# Patient Record
Sex: FEMALE | Race: WHITE | NOT HISPANIC OR LATINO | URBAN - METROPOLITAN AREA
[De-identification: names, ages, dates, MRNs, and addresses within clinical notes are randomized per-mention and may not be internally consistent; named-entity substitution may affect disease eponyms.]

---

## 2021-05-11 ENCOUNTER — LAB (OUTPATIENT)
Dept: LAB | Facility: HOSPITAL | Age: 46
End: 2021-05-11

## 2021-05-11 ENCOUNTER — TRANSCRIBE ORDERS (OUTPATIENT)
Dept: ADMINISTRATIVE | Facility: HOSPITAL | Age: 46
End: 2021-05-11

## 2021-05-11 ENCOUNTER — HOSPITAL ENCOUNTER (OUTPATIENT)
Dept: CARDIOLOGY | Facility: HOSPITAL | Age: 46
Discharge: HOME | End: 2021-05-11

## 2021-05-11 DIAGNOSIS — Z01.818 PREOP EXAMINATION: ICD-10-CM

## 2021-05-11 DIAGNOSIS — Z01.818 PREOP EXAMINATION: Primary | ICD-10-CM

## 2021-05-11 PROCEDURE — 93010 ELECTROCARDIOGRAM REPORT: CPT | Performed by: INTERNAL MEDICINE

## 2021-05-11 PROCEDURE — 93005 ELECTROCARDIOGRAM TRACING: CPT | Performed by: OBSTETRICS & GYNECOLOGY

## 2021-05-11 PROCEDURE — 81003 URINALYSIS AUTO W/O SCOPE: CPT

## 2021-05-11 PROCEDURE — 85025 COMPLETE CBC W/AUTO DIFF WBC: CPT

## 2021-05-11 PROCEDURE — 80048 BASIC METABOLIC PNL TOTAL CA: CPT

## 2021-05-11 PROCEDURE — 36415 COLL VENOUS BLD VENIPUNCTURE: CPT

## 2021-05-12 LAB — QT INTERVAL: 421 MS

## 2024-01-10 ENCOUNTER — OFFICE VISIT (OUTPATIENT)
Dept: PODIATRY | Facility: CLINIC | Age: 49
End: 2024-01-10
Payer: COMMERCIAL

## 2024-01-10 VITALS — WEIGHT: 255 LBS | BODY MASS INDEX: 43.54 KG/M2 | HEIGHT: 64 IN | RESPIRATION RATE: 20 BRPM

## 2024-01-10 DIAGNOSIS — M76.61 ACHILLES TENDINITIS, RIGHT LEG: ICD-10-CM

## 2024-01-10 DIAGNOSIS — M92.62 HAGLUND'S DEFORMITY OF BOTH HEELS: ICD-10-CM

## 2024-01-10 DIAGNOSIS — M25.572 CHRONIC PAIN OF BOTH ANKLES: Primary | ICD-10-CM

## 2024-01-10 DIAGNOSIS — M25.571 CHRONIC PAIN OF BOTH ANKLES: Primary | ICD-10-CM

## 2024-01-10 DIAGNOSIS — E66.01 MORBID OBESITY WITH BMI OF 40.0-44.9, ADULT: ICD-10-CM

## 2024-01-10 DIAGNOSIS — G89.29 CHRONIC PAIN OF BOTH ANKLES: Primary | ICD-10-CM

## 2024-01-10 DIAGNOSIS — M76.62 ACHILLES TENDINITIS OF LEFT LOWER EXTREMITY: ICD-10-CM

## 2024-01-10 DIAGNOSIS — M21.861 ACQUIRED POSTERIOR EQUINUS OF BOTH LOWER EXTREMITIES: ICD-10-CM

## 2024-01-10 DIAGNOSIS — M92.61 HAGLUND'S DEFORMITY OF BOTH HEELS: ICD-10-CM

## 2024-01-10 DIAGNOSIS — M21.862 ACQUIRED POSTERIOR EQUINUS OF BOTH LOWER EXTREMITIES: ICD-10-CM

## 2024-01-10 RX ORDER — UBIDECARENONE 30 MG
CAPSULE ORAL
COMMUNITY
Start: 2016-04-14

## 2024-01-10 RX ORDER — CHOLECALCIFEROL (VITAMIN D3) 125 MCG
CAPSULE ORAL
COMMUNITY

## 2024-01-10 RX ORDER — FLUTICASONE PROPIONATE 50 MCG
SPRAY, SUSPENSION (ML) NASAL
COMMUNITY
Start: 2016-04-14

## 2024-01-10 RX ORDER — CETIRIZINE HYDROCHLORIDE 10 MG/1
CAPSULE, LIQUID FILLED ORAL
COMMUNITY
Start: 2016-04-14

## 2024-01-10 RX ORDER — TRIAMCINOLONE ACETONIDE 40 MG/ML
20 INJECTION, SUSPENSION INTRA-ARTICULAR; INTRAMUSCULAR ONCE
Status: COMPLETED | OUTPATIENT
Start: 2024-01-10 | End: 2024-01-10

## 2024-01-10 RX ORDER — ASPIRIN 325 MG
TABLET ORAL
COMMUNITY
Start: 2016-04-14

## 2024-01-10 RX ORDER — ERGOCALCIFEROL 1.25 MG/1
50000 CAPSULE ORAL 2 TIMES WEEKLY
COMMUNITY

## 2024-01-10 RX ORDER — MELATONIN
COMMUNITY
Start: 2016-04-14

## 2024-01-10 RX ORDER — MELOXICAM 15 MG/1
TABLET ORAL
COMMUNITY
Start: 2016-04-14

## 2024-01-10 RX ADMIN — TRIAMCINOLONE ACETONIDE 20 MG: 40 INJECTION, SUSPENSION INTRA-ARTICULAR; INTRAMUSCULAR at 12:08

## 2024-01-10 NOTE — PROGRESS NOTES
"01/10/2024  Foot and Ankle Surgery - New Patient   Provider: Dr. Bipin Wells DPM  Location: [facility]    Subjective:  Sharri Bernstein is a 48 y.o. female.     Chief Complaint   Patient presents with    Right Ankle - Pain    Left Ankle - Pain    Initial Evaluation     NADINE ayoub md  12/24/2021       The patient is a 48-year-old female who presents to the clinic as a new patient today for evaluation of bilateral ankle pain.    The patient was last seen in 2016. She reports she is no longer a  working 8 to 10 hours per day, ascending and descending stairs. She notes, at that time, her work activities caused repeated \"ripping\" of scar tissue. The patient states she was prescribed meloxicam and referred to physical therapy, which was beneficial for a short time.    Currently, the patient complains of bilateral ankle pain, left greater than right, ongoing over the last 1 year. 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 She adds that occasionally the pain is so severe, it feels as if it is going to \"rip.\" The patient states her pain is impacting her daily activities. Additionally, she states she dislocated her knee in 2022, which is affecting her gait as her knee will no longer fully extend secondary to scar tissue. The patient recalls sustaining an injury to her left ankle in 10/2023, and she is left lower extremity dominant.  The patient reports she trialed heel lifts previously. The patient states her plantar fasciitis is no longer an issue aside from an occasional flareup. She reports she has gradually lost 40 pounds over the last 1 year. The patient states she used to walk 3 to 5 miles every day; however, she can barely ambulate some days. She would like to get a possible temporary placard. She inquiries about a scar tissue release " surgery.    The patient reports a history of gallbladder issues.      Allergies   Allergen Reactions    Penicillins Unknown - High Severity    Sulfa Antibiotics Unknown - High Severity       Past Medical History:   Diagnosis Date    COPD (chronic obstructive pulmonary disease)     Difficulty walking     Plantar fasciitis        History reviewed. No pertinent surgical history.    Family History   Problem Relation Age of Onset    Hypertension Father     Cancer Maternal Grandmother        Social History     Socioeconomic History    Marital status:    Tobacco Use    Smoking status: Former     Passive exposure: Past    Smokeless tobacco: Never   Vaping Use    Vaping Use: Never used   Substance and Sexual Activity    Alcohol use: Not Currently    Drug use: Never    Sexual activity: Yes        Current Outpatient Medications on File Prior to Visit   Medication Sig Dispense Refill    Cetirizine HCl (ZyrTEC Allergy) 10 MG capsule Zyrtec 10 mg capsule   Take by oral route.      fluticasone (FLONASE) 50 MCG/ACT nasal spray fluticasone propionate 50 mcg/actuation nasal spray,suspension   SPRAY 1 SPRAY INTO EACH NOSTRIL EVERY DAY      melatonin 5 MG tablet tablet melatonin 5 mg tablet   TAKE 1 TABLET BY MOUTH EVERYDAY AT BEDTIME      metFORMIN (GLUCOPHAGE) 500 MG tablet metformin 500 mg tablet   TAKE 1 TABLET BY MOUTH TWICE A DAY.      Multiple Vitamins-Minerals (Multivitamin Gummies Adult) chewable tablet MULTIVITAMIN GUMMIES ADULT CHEW      cholecalciferol (Vitamin D-1000 Max St) 25 MCG (1000 UT) tablet VITAMIN D-1000 MAX ST 1000 UNIT TABS (Patient not taking: Reported on 1/10/2024)      meloxicam (Mobic) 15 MG tablet MOBIC 15 MG TABS (Patient not taking: Reported on 1/10/2024)      Omega-3 Fatty Acids (Fish Oil Maximum Strength) 1200 MG capsule FISH OIL MAXIMUM STRENGTH 1200 MG CAPS (Patient not taking: Reported on 1/10/2024)      vitamin D (ERGOCALCIFEROL) 1.25 MG (42061 UT) capsule capsule Take 1 capsule by mouth 2  "(Two) Times a Week. (Patient not taking: Reported on 1/10/2024)       No current facility-administered medications on file prior to visit.       Review of Systems:  General: Denies fever, chills, fatigue, and weakness.  Eyes: Denies vision loss, blurry vision, and excessive redness.  ENT: Denies hearing issues and difficulty swallowing.  Cardiovascular: Denies palpitations, chest pain, or syncopal episodes.  Respiratory: Denies shortness of breath, wheezing, and coughing.  GI: Denies abdominal pain, nausea, and vomiting.   : Denies frequency, hematuria, and urgency.  Musculoskeletal: Denies muscle cramps, joint pains, and stiffness. Positive for bilateral ankle pain, left greater than right.  Derm: Denies rash, open wounds, or suspicious lesions.  Neuro: Denies headaches, numbness, loss of coordination, and tremors.  Psych: Denies anxiety and depression.  Endocrine: Denies temperature intolerance and changes in appetite.  Heme: Denies bleeding disorders or abnormal bruising.     Objective   Resp 20   Ht 162.6 cm (64\")   Wt 116 kg (255 lb)   BMI 43.77 kg/m²     Foot/Ankle Exam    GENERAL  Appearance:  obese  Orientation:  AAOx3  Affect:  appropriate    VASCULAR     Right Foot Vascularity   Normal vascular exam    Dorsalis pedis:  2+  Posterior tibial:  2+  Skin temperature:  warm  Edema grading:  None  CFT:  < 3 seconds  Pedal hair growth:  Present  Varicosities:  none     Left Foot Vascularity   Normal vascular exam    Dorsalis pedis:  2+  Posterior tibial:  2+  Skin temperature:  warm  Edema grading:  None  CFT:  < 3 seconds  Pedal hair growth:  Present  Varicosities:  none     NEUROLOGIC     Right Foot Neurologic   Light touch sensation: normal  Hot/Cold sensation: normal  Achilles reflex:  2+     Left Foot Neurologic   Light touch sensation: normal  Hot/Cold sensation:  normal  Achilles reflex:  2+    MUSCULOSKELETAL     Right Foot Musculoskeletal   Arch:  Normal     Left Foot Musculoskeletal   Arch:  " Normal    MUSCLE STRENGTH     Right Foot Muscle Strength   Normal strength    Foot dorsiflexion:  5  Foot plantar flexion:  5  Foot inversion:  5  Foot eversion:  5     Left Foot Muscle Strength   Normal strength    Foot dorsiflexion:  5  Foot plantar flexion:  5  Foot inversion:  5  Foot eversion:  5    DERMATOLOGIC      Right Foot Dermatologic   Skin  Right foot skin is intact.   Nails comment:  Nails 1-5     Left Foot Dermatologic   Skin  Left foot skin is intact.   Nails comment:  Nails 1-5    TESTS     Right Foot Tests   Anterior drawer: negative  Varus tilt: negative     Left Foot Tests   Anterior drawer: negative  Varus tilt: negative     Right foot additional comments: Discomfort with palpation involving the posterior aspect of the left heel greater than right. Significant hypertrophy noted to the left heel. Moderate equinus contracture with knee extended and flexed to bilateral lower extremities. Mild discomfort involving the inferior aspect of bilateral heels.     Left foot additional comments: Discomfort with palpation involving the posterior aspect of the left heel greater than right. Significant hypertrophy noted to the left heel. Moderate equinus contracture with knee extended and flexed to bilateral lower extremities. Mild discomfort involving the inferior aspect of bilateral heels.      Assessment & Plan   Diagnoses and all orders for this visit:    1. Chronic pain of both ankles (Primary)  -     XR Ankle 3+ View Bilateral; Future  -     Ambulatory Referral to Physical Therapy Evaluate and treat (Strengthening/Stretching, Manual Therapy, Ultrasound, Estim)    2. Achilles tendinitis of left lower extremity  -     triamcinolone acetonide (KENALOG-40) injection 20 mg    3. Achilles tendinitis, right leg    4. Tana's deformity of both heels    5. Acquired posterior equinus of both lower extremities    6. Morbid obesity with BMI of 40.0-44.9, adult      The patient presents to the office as a new  patient today for evaluation of bilateral ankle pain. X-rays of the bilateral ankles, obtained today, 01/10/2024, were independently reviewed today. Imaging, diagnosis, and treatment options were discussed at length with the patient. Explained she has a significant amount of tightness along the Achilles tendon and it is pulling on the heel which creates microscopic tearing involving the insertion of the Achilles tendon. Recommended symptom management with a steroid injection and shoe gear with a slight heel to offload the pull of the tendon. Advised against ambulating while barefoot or in flats, sandals, or flip-flops. Discussed returning to physical therapy. Additionally, discussed surgical intervention consisting of a tendon lengthening procedure to her ankles bilaterally, beginning with the left ankle, along with the risks, benefits, and recovery at length. Discussed the surgery is a 20-minute outpatient procedure, and she will be in a boot approximately 4 to 6 weeks postoperatively. Advised she may need to take some time off work. The patient would like to proceed with a steroid injection at this time. Greater than 30 minutes was spent before, during, and after evaluation for patient care.    Orders Placed This Encounter   Procedures    XR Ankle 3+ View Bilateral     Standing Status:   Future     Number of Occurrences:   1     Standing Expiration Date:   1/10/2025     Order Specific Question:   Reason for Exam:     Answer:   mel aknle pain rm 14 wb     Order Specific Question:   Patient Pregnant     Answer:   No     Order Specific Question:   Release to patient     Answer:   Routine Release [[id number]]    Ambulatory Referral to Physical Therapy Evaluate and treat (Strengthening/Stretching, Manual Therapy, Ultrasound, Estim)     Referral Priority:   Routine     Referral Type:   Physical Therapy     Referral Reason:   Specialty Services Required     Requested Specialty:   Physical Therapy     Number of Visits  Requested:   1        Note is dictated utilizing voice recognition software. Unfortunately this leads to occasional typographical errors. I apologize in advance if the situation occurs. If questions occur please do not hesitate to call our office.    Transcribed from ambient dictation for HELIO Wells DPM by Dara Wakefield.  01/10/24   12:33 EST    Patient or patient representative verbalized consent to the visit recording.  I have personally performed the services described in this document as transcribed by the above individual, and it is both accurate and complete.

## 2024-01-10 NOTE — PROGRESS NOTES
01/10/2024    Retrocalcaneal/ Achilles Steroid Injection: Left heel    Consent and time out was performed before proceeding with the procedure. The skin about the lateral Kagar's triangle region of the left foot was cleansed with alcohol. Ultrasound guidance was used to evaluate the Achilles tendon and injection guidance. Using aseptic technique, a 1.5 mL solution containing 0.5 mL of 0.5% Marcaine plain, 0.5mL of 1% lidocaine plain and 0.5 mL of Kenalog was injected to the insertion site of the Achilles tendon. After the injection, compression was applied followed by a sterile bandage. The patient noted relief from pain and tolerated the injection well without complication.    Transcribed from ambient dictation for HELIO Wells DPM by Dara Wakefield.  01/10/24   12:36 EST    Patient or patient representative verbalized consent to the visit recording.  I have personally performed the services described in this document as transcribed by the above individual, and it is both accurate and complete.

## 2024-01-11 ENCOUNTER — PATIENT ROUNDING (BHMG ONLY) (OUTPATIENT)
Dept: ORTHOPEDIC SURGERY | Facility: CLINIC | Age: 49
End: 2024-01-11
Payer: COMMERCIAL

## 2024-04-15 ENCOUNTER — OFFICE VISIT (OUTPATIENT)
Dept: PODIATRY | Facility: CLINIC | Age: 49
End: 2024-04-15
Payer: COMMERCIAL

## 2024-04-15 VITALS — WEIGHT: 255 LBS | BODY MASS INDEX: 43.54 KG/M2 | RESPIRATION RATE: 20 BRPM | HEIGHT: 64 IN

## 2024-04-15 DIAGNOSIS — M92.61 HAGLUND'S DEFORMITY OF BOTH HEELS: ICD-10-CM

## 2024-04-15 DIAGNOSIS — M92.62 HAGLUND'S DEFORMITY OF BOTH HEELS: ICD-10-CM

## 2024-04-15 DIAGNOSIS — G89.29 CHRONIC PAIN OF BOTH ANKLES: Primary | ICD-10-CM

## 2024-04-15 DIAGNOSIS — M21.862 ACQUIRED POSTERIOR EQUINUS OF BOTH LOWER EXTREMITIES: ICD-10-CM

## 2024-04-15 DIAGNOSIS — M25.572 CHRONIC PAIN OF BOTH ANKLES: Primary | ICD-10-CM

## 2024-04-15 DIAGNOSIS — M21.861 ACQUIRED POSTERIOR EQUINUS OF BOTH LOWER EXTREMITIES: ICD-10-CM

## 2024-04-15 DIAGNOSIS — M76.62 ACHILLES TENDINITIS OF LEFT LOWER EXTREMITY: ICD-10-CM

## 2024-04-15 DIAGNOSIS — M25.571 CHRONIC PAIN OF BOTH ANKLES: Primary | ICD-10-CM

## 2024-04-15 DIAGNOSIS — M77.52 RETROCALCANEAL BURSITIS, LEFT: ICD-10-CM

## 2024-04-15 DIAGNOSIS — E66.01 MORBID OBESITY WITH BMI OF 40.0-44.9, ADULT: ICD-10-CM

## 2024-04-15 PROCEDURE — 20551 NJX 1 TENDON ORIGIN/INSJ: CPT | Performed by: PODIATRIST

## 2024-04-15 PROCEDURE — 99213 OFFICE O/P EST LOW 20 MIN: CPT | Performed by: PODIATRIST

## 2024-04-15 RX ORDER — TRIAMCINOLONE ACETONIDE 40 MG/ML
20 INJECTION, SUSPENSION INTRA-ARTICULAR; INTRAMUSCULAR ONCE
Status: COMPLETED | OUTPATIENT
Start: 2024-04-15 | End: 2024-04-15

## 2024-04-15 RX ADMIN — TRIAMCINOLONE ACETONIDE 20 MG: 40 INJECTION, SUSPENSION INTRA-ARTICULAR; INTRAMUSCULAR at 11:15

## 2024-04-16 NOTE — PROGRESS NOTES
04/15/2024  Foot and Ankle Surgery - Established Patient/Follow-up  Provider: Dr. Bipin Wells DPM  Location: [facility]    Subjective:  Sharri Bernstein is a 48 y.o. female.     Chief Complaint   Patient presents with    Right Ankle - Follow-up, Pain    Left Ankle - Follow-up, Pain    Follow-up     NADINE Ferguson md 2/24/2021       History of Present Illness  The patient presents for evaluation of bilateral ankle pain.    The patient was last evaluated approximately 3 months ago. Despite receiving an injection, she continues to experience significant discomfort, with the left side being more severely affected than the right. The injection provided significant relief, reducing her pain from a 5 to a 2, however, the pain has since returned. She has undergone 8 sessions of physical therapy, during which an ultrasound was performed to ascertain the extent of scar tissue breakage. The patient has not been engaging in low-impact exercises due to a lack of access to a pool in her residence, but has recently reintroduced a recumbent gym into her routine. She has been performing plantar stretches, calf stretches, and alphabet stretches, which have provided significant relief. She is contemplating surgical intervention in the upcoming fall.      Allergies   Allergen Reactions    Penicillins Unknown - High Severity    Sulfa Antibiotics Unknown - High Severity       Current Outpatient Medications on File Prior to Visit   Medication Sig Dispense Refill    Cetirizine HCl (ZyrTEC Allergy) 10 MG capsule Zyrtec 10 mg capsule   Take by oral route.      cholecalciferol (Vitamin D-1000 Max St) 25 MCG (1000 UT) tablet       fluticasone (FLONASE) 50 MCG/ACT nasal spray fluticasone propionate 50 mcg/actuation nasal spray,suspension   SPRAY 1 SPRAY INTO EACH NOSTRIL EVERY DAY      melatonin 5 MG tablet tablet melatonin 5 mg tablet   TAKE 1 TABLET BY MOUTH EVERYDAY AT BEDTIME      metFORMIN (GLUCOPHAGE) 500 MG tablet metformin  "500 mg tablet   TAKE 1 TABLET BY MOUTH TWICE A DAY.      Multiple Vitamins-Minerals (Multivitamin Gummies Adult) chewable tablet MULTIVITAMIN GUMMIES ADULT CHEW      Omega-3 Fatty Acids (Fish Oil Maximum Strength) 1200 MG capsule       vitamin D (ERGOCALCIFEROL) 1.25 MG (36645 UT) capsule capsule Take 1 capsule by mouth 2 (Two) Times a Week.      meloxicam (Mobic) 15 MG tablet MOBIC 15 MG TABS (Patient not taking: Reported on 1/10/2024)       No current facility-administered medications on file prior to visit.       Objective   Resp 20   Ht 162.6 cm (64\")   Wt 116 kg (255 lb)   BMI 43.77 kg/m²     Foot/Ankle Exam  Physical Exam  GENERAL  Appearance:  obese  Orientation:  AAOx3  Affect:  appropriate     VASCULAR      Right Foot Vascularity   Normal vascular exam    Dorsalis pedis:  2+  Posterior tibial:  2+  Skin temperature:  warm  Edema grading:  None  CFT:  < 3 seconds  Pedal hair growth:  Present  Varicosities:  none      Left Foot Vascularity   Normal vascular exam    Dorsalis pedis:  2+  Posterior tibial:  2+  Skin temperature:  warm  Edema grading:  None  CFT:  < 3 seconds  Pedal hair growth:  Present  Varicosities:  none     NEUROLOGIC      Right Foot Neurologic   Light touch sensation: normal  Hot/Cold sensation: normal  Achilles reflex:  2+      Left Foot Neurologic   Light touch sensation: normal  Hot/Cold sensation:  normal  Achilles reflex:  2+     MUSCULOSKELETAL      Right Foot Musculoskeletal   Arch:  Normal      Left Foot Musculoskeletal   Arch:  Normal     MUSCLE STRENGTH      Right Foot Muscle Strength   Normal strength    Foot dorsiflexion:  5  Foot plantar flexion:  5  Foot inversion:  5  Foot eversion:  5      Left Foot Muscle Strength   Normal strength    Foot dorsiflexion:  5  Foot plantar flexion:  5  Foot inversion:  5  Foot eversion:  5     DERMATOLOGIC       Right Foot Dermatologic   Skin  Right foot skin is intact.   Nails comment:  Nails 1-5      Left Foot Dermatologic   Skin  Left " foot skin is intact.   Nails comment:  Nails 1-5     TESTS      Right Foot Tests   Anterior drawer: negative  Varus tilt: negative      Left Foot Tests   Anterior drawer: negative  Varus tilt: negative     Right foot additional comments: Discomfort with palpation involving the posterior aspect of the left heel greater than right. Significant hypertrophy noted to the left heel. Moderate equinus contracture with knee extended and flexed to bilateral lower extremities. Mild discomfort involving the inferior aspect of bilateral heels.     Left foot additional comments: Discomfort with palpation involving the posterior aspect of the left heel greater than right. Significant hypertrophy noted to the left heel. Moderate equinus contracture with knee extended and flexed to bilateral lower extremities. Mild discomfort involving the inferior aspect of bilateral heels.    4/15/24: Continued discomfort involving the posterior superior aspect of the left heel.  Achilles tendon remains grossly intact without any significant hypertrophy.  Swelling noted to the retrocalcaneal region.      Results  Imaging  Significant Tana's deformity in the Achilles tendon.    Assessment & Plan   Diagnoses and all orders for this visit:    1. Chronic pain of both ankles (Primary)    2. Achilles tendinitis of left lower extremity  -     Injection Tendon or Ligament  -     triamcinolone acetonide (KENALOG-40) injection 20 mg    3. Tana's deformity of both heels    4. Acquired posterior equinus of both lower extremities    5. Morbid obesity with BMI of 40.0-44.9, adult      Assessment & Plan    The patient presents with significant Tana deformity, which is exerting significant stress on the Achilles tendon during each step. The patient was counseled to engage in low-impact exercises such as biking, using an elliptical machine, swimming, or water aerobics. A steroid injection will be administered to the same area as previously administered.  The patient was also advised to persist with stretching and supportive shoes. Should the patient continue to experience significant pain, surgical options may be considered.     Retrocalcaneal/ Achilles Steroid Injection: Left    Consent and time out was performed before proceeding with injection.  The skin about the lateral Kagar's triangle region of the left foot was cleansed with alcohol.  Ultrasound guidance was used to evaluate then Achilles tendon and injection guidance. Using an aseptic technique, a 1.5 mL solution containing 0.5 mL of 0.5% Marcaine plain, 0.5mL of 1% lidocaine plain and 0.5 mL of Kenalog was injected to the insertion site of the Achilles tendon. After the injection, compression was applied followed by a sterile bandage.  The patient noted relief from pain and tolerated the injection well without complication.    Follow-up  The patient is scheduled for a follow-up visit in 3 months.    Reviewed proper basic stretching and manual therapy exercises along with appropriate shoes and activity.  Discussed proper use and/or avoidance of OTC anti-inflammatories.  Patient is to call with any additional issues or concerns.  Greater than 20 minutes was spent before, during, and after evaluation for patient care.             Patient or patient representative verbalized consent for the use of Ambient Listening during the visit with  HELIO Wells DPM for chart documentation. 4/16/2024  07:30 EDT    HELIO Wells DPM

## 2024-08-28 ENCOUNTER — TELEPHONE (OUTPATIENT)
Dept: PODIATRY | Facility: CLINIC | Age: 49
End: 2024-08-28
Payer: COMMERCIAL

## 2024-08-28 NOTE — TELEPHONE ENCOUNTER
(Pt knows provider out of office) Last appt 4/15/2024.  Requesting handicap card for fair event. She states parking lot is too far to walk. Ok to provide?

## 2024-08-28 NOTE — TELEPHONE ENCOUNTER
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

## 2024-09-03 NOTE — TELEPHONE ENCOUNTER
Needs appt per .   Result letter sent of CMP, TSH, and lipid results and recommendations per patient preference.

## 2024-09-05 ENCOUNTER — OFFICE VISIT (OUTPATIENT)
Dept: PODIATRY | Facility: CLINIC | Age: 49
End: 2024-09-05
Payer: COMMERCIAL

## 2024-09-05 VITALS
OXYGEN SATURATION: 98 % | BODY MASS INDEX: 43.54 KG/M2 | HEART RATE: 85 BPM | WEIGHT: 255 LBS | HEIGHT: 64 IN | RESPIRATION RATE: 20 BRPM

## 2024-09-05 DIAGNOSIS — M21.861 ACQUIRED POSTERIOR EQUINUS OF BOTH LOWER EXTREMITIES: ICD-10-CM

## 2024-09-05 DIAGNOSIS — M77.52 RETROCALCANEAL BURSITIS, LEFT: ICD-10-CM

## 2024-09-05 DIAGNOSIS — M21.862 ACQUIRED POSTERIOR EQUINUS OF BOTH LOWER EXTREMITIES: ICD-10-CM

## 2024-09-05 DIAGNOSIS — M92.62 HAGLUND'S DEFORMITY OF BOTH HEELS: ICD-10-CM

## 2024-09-05 DIAGNOSIS — E66.01 MORBID OBESITY WITH BMI OF 40.0-44.9, ADULT: ICD-10-CM

## 2024-09-05 DIAGNOSIS — M76.62 ACHILLES TENDINITIS OF LEFT LOWER EXTREMITY: ICD-10-CM

## 2024-09-05 DIAGNOSIS — G89.29 CHRONIC PAIN OF BOTH ANKLES: Primary | ICD-10-CM

## 2024-09-05 DIAGNOSIS — M92.61 HAGLUND'S DEFORMITY OF BOTH HEELS: ICD-10-CM

## 2024-09-05 DIAGNOSIS — M25.572 CHRONIC PAIN OF BOTH ANKLES: Primary | ICD-10-CM

## 2024-09-05 DIAGNOSIS — M25.571 CHRONIC PAIN OF BOTH ANKLES: Primary | ICD-10-CM

## 2024-09-05 RX ORDER — SODIUM FLUORIDE 6 MG/ML
PASTE, DENTIFRICE DENTAL
COMMUNITY
Start: 2024-07-06

## 2024-09-05 RX ORDER — TRIAMCINOLONE ACETONIDE 40 MG/ML
20 INJECTION, SUSPENSION INTRA-ARTICULAR; INTRAMUSCULAR ONCE
Status: COMPLETED | OUTPATIENT
Start: 2024-09-05 | End: 2024-09-05

## 2024-09-05 RX ADMIN — TRIAMCINOLONE ACETONIDE 20 MG: 40 INJECTION, SUSPENSION INTRA-ARTICULAR; INTRAMUSCULAR at 15:05

## 2024-09-06 NOTE — PROGRESS NOTES
09/05/2024  Foot and Ankle Surgery - Established Patient/Follow-up  Provider: Dr. Bipin Wells DPM  Location: [facility]    Subjective:  Sharri Bernstein is a 48 y.o. female.     Chief Complaint   Patient presents with    Right Ankle - Follow-up, Pain    Left Ankle - Follow-up, Pain    Follow-up     NADINE Hale last pcp visit 11/15/2023       History of Present Illness  The patient presents for evaluation of Tana's deformity.    She continues to experience significant discomfort in both feet, with the left foot causing more distress. Her pain levels fluctuate, with instances of severe pain following periods of increased activity. To manage the pain, she performs stretching exercises and takes two extra strength Tylenol and two ibuprofen as needed. However, she tries to limit the use of these medications due to existing kidney issues. She also makes an effort to stay hydrated and wears loose shoes to avoid putting pressure on her ankles.    She has been diagnosed with Tana's deformity and is aware that her condition will not improve without intervention. She is considering surgery as a potential solution. She has joined a support group for individuals with Tana's deformity and is actively seeking ways to prepare for potential surgery.    She has lost 50 pounds in total, with a recent loss of 8 pounds. She attributes this weight loss to dietary changes and increased physical activity, including cleaning. 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    She was provided with ankle support wraps in 2016, but they are now worn out and she is requesting a replacement.    She also has gallstones.      Allergies   Allergen Reactions    Penicillins Unknown - High Severity    Sulfa Antibiotics Unknown - High Severity       Current Outpatient Medications on File Prior to Visit   Medication Sig Dispense Refill    metFORMIN (GLUCOPHAGE) 500 MG tablet metformin 500 mg tablet    "TAKE 1 TABLET BY MOUTH TWICE A DAY.      Cetirizine HCl (ZyrTEC Allergy) 10 MG capsule Zyrtec 10 mg capsule   Take by oral route. (Patient not taking: Reported on 9/5/2024)      cholecalciferol (Vitamin D-1000 Max St) 25 MCG (1000 UT) tablet  (Patient not taking: Reported on 9/5/2024)      fluticasone (FLONASE) 50 MCG/ACT nasal spray fluticasone propionate 50 mcg/actuation nasal spray,suspension   SPRAY 1 SPRAY INTO EACH NOSTRIL EVERY DAY (Patient not taking: Reported on 9/5/2024)      melatonin 5 MG tablet tablet melatonin 5 mg tablet   TAKE 1 TABLET BY MOUTH EVERYDAY AT BEDTIME (Patient not taking: Reported on 9/5/2024)      meloxicam (Mobic) 15 MG tablet MOBIC 15 MG TABS (Patient not taking: Reported on 1/10/2024)      Multiple Vitamins-Minerals (Multivitamin Gummies Adult) chewable tablet MULTIVITAMIN GUMMIES ADULT CHEW (Patient not taking: Reported on 9/5/2024)      Omega-3 Fatty Acids (Fish Oil Maximum Strength) 1200 MG capsule  (Patient not taking: Reported on 9/5/2024)      Sodium Fluoride 5000 PPM 1.1 % paste USE DAILY TO BRUSH TEETH AS DIRECTED (Patient not taking: Reported on 9/5/2024)      vitamin D (ERGOCALCIFEROL) 1.25 MG (00518 UT) capsule capsule Take 1 capsule by mouth 2 (Two) Times a Week. (Patient not taking: Reported on 9/5/2024)       No current facility-administered medications on file prior to visit.       Objective   Pulse 85   Resp 20   Ht 162.6 cm (64\")   Wt 116 kg (255 lb)   SpO2 98%   BMI 43.77 kg/m²     Foot/Ankle Exam  Physical Exam    GENERAL  Appearance:  obese  Orientation:  AAOx3  Affect:  appropriate     VASCULAR      Right Foot Vascularity   Normal vascular exam    Dorsalis pedis:  2+  Posterior tibial:  2+  Skin temperature:  warm  Edema grading:  None  CFT:  < 3 seconds  Pedal hair growth:  Present  Varicosities:  none      Left Foot Vascularity   Normal vascular exam    Dorsalis pedis:  2+  Posterior tibial:  2+  Skin temperature:  warm  Edema grading:  None  CFT:  < 3 " seconds  Pedal hair growth:  Present  Varicosities:  none     NEUROLOGIC      Right Foot Neurologic   Light touch sensation: normal  Hot/Cold sensation: normal  Achilles reflex:  2+      Left Foot Neurologic   Light touch sensation: normal  Hot/Cold sensation:  normal  Achilles reflex:  2+     MUSCULOSKELETAL      Right Foot Musculoskeletal   Arch:  Normal      Left Foot Musculoskeletal   Arch:  Normal     MUSCLE STRENGTH      Right Foot Muscle Strength   Normal strength    Foot dorsiflexion:  5  Foot plantar flexion:  5  Foot inversion:  5  Foot eversion:  5      Left Foot Muscle Strength   Normal strength    Foot dorsiflexion:  5  Foot plantar flexion:  5  Foot inversion:  5  Foot eversion:  5     DERMATOLOGIC       Right Foot Dermatologic   Skin  Right foot skin is intact.   Nails comment:  Nails 1-5      Left Foot Dermatologic   Skin  Left foot skin is intact.   Nails comment:  Nails 1-5     TESTS      Right Foot Tests   Anterior drawer: negative  Varus tilt: negative      Left Foot Tests   Anterior drawer: negative  Varus tilt: negative     Right foot additional comments: Discomfort with palpation involving the posterior aspect of the left heel greater than right. Significant hypertrophy noted to the left heel. Moderate equinus contracture with knee extended and flexed to bilateral lower extremities. Mild discomfort involving the inferior aspect of bilateral heels.     Left foot additional comments: Discomfort with palpation involving the posterior aspect of the left heel greater than right. Significant hypertrophy noted to the left heel. Moderate equinus contracture with knee extended and flexed to bilateral lower extremities. Mild discomfort involving the inferior aspect of bilateral heels.     4/15/24: Continued discomfort involving the posterior superior aspect of the left heel.  Achilles tendon remains grossly intact without any significant hypertrophy.  Swelling noted to the retrocalcaneal  region.    9/5/24: No progressive deformity or instability.  Continued pain and hypertrophy involving the posterior lateral aspect of the left heel.  Discomfort with palpation.  No obvious Achilles tendon deficit.    Results      Assessment & Plan   Diagnoses and all orders for this visit:    1. Chronic pain of both ankles (Primary)    2. Achilles tendinitis of left lower extremity  -     triamcinolone acetonide (KENALOG-40) injection 20 mg    3. Retrocalcaneal bursitis, left    4. Tana's deformity of both heels    5. Acquired posterior equinus of both lower extremities    6. Morbid obesity with BMI of 40.0-44.9, adult      Assessment & Plan    The patient's symptoms are consistent with Tana's deformity, with the left foot causing the most aggravation. A steroid injection will be administered today to alleviate symptoms. She is advised to engage in low-impact exercises such as biking, using the elliptical, or swimming to aid in symptom management and recovery. A new ankle support wrap will be provided. She is also advised to avoid wearing sandals or flip-flops and to opt for closed-toe shoes like tennis shoes to help offload pressure from the heel.    She reports using two extra strength Tylenol and two ibuprofen for severe pain, as advised by her knee doctor. She is advised to continue this regimen as needed but to avoid overuse due to potential kidney issues.    She has successfully lost 50 pounds and is advised to continue her weight loss efforts. Increasing low-impact physical activities is recommended to aid in further weight loss and overall health improvement.Reviewed proper basic stretching and manual therapy exercises along with appropriate shoes and activity.  Discussed proper use and/or avoidance of OTC anti-inflammatories.  Patient is to call with any additional issues or concerns.  Greater than 20 minutes was spent before, during, and after evaluation for patient care.     Retrocalcaneal/ Achilles  Steroid Injection: Left    Consent and time out was performed before proceeding with injection.  The skin about the lateral Kagar's triangle region of the left foot was cleansed with alcohol.  Ultrasound guidance was used to evaluate then Achilles tendon and injection guidance. Using an aseptic technique, a 1.5 mL solution containing 0.5 mL of 0.5% Marcaine plain, 0.5mL of 1% lidocaine plain and 0.5 mL of Kenalog was injected to the insertion site of the Achilles tendon. After the injection, compression was applied followed by a sterile bandage.  The patient noted relief from pain and tolerated the injection well without complication.         Patient or patient representative verbalized consent for the use of Ambient Listening during the visit with  HELIO Wells DPM for chart documentation. 9/6/2024  13:45 EDT    HELIO Wells DPM

## 2024-12-05 ENCOUNTER — OFFICE VISIT (OUTPATIENT)
Age: 49
End: 2024-12-05
Payer: COMMERCIAL

## 2024-12-05 VITALS — HEIGHT: 64 IN | WEIGHT: 255 LBS | RESPIRATION RATE: 20 BRPM | BODY MASS INDEX: 43.54 KG/M2

## 2024-12-05 DIAGNOSIS — M77.52 RETROCALCANEAL BURSITIS, LEFT: ICD-10-CM

## 2024-12-05 DIAGNOSIS — M92.61 HAGLUND'S DEFORMITY OF BOTH HEELS: ICD-10-CM

## 2024-12-05 DIAGNOSIS — M21.861 ACQUIRED POSTERIOR EQUINUS OF BOTH LOWER EXTREMITIES: ICD-10-CM

## 2024-12-05 DIAGNOSIS — G89.29 CHRONIC PAIN OF BOTH ANKLES: Primary | ICD-10-CM

## 2024-12-05 DIAGNOSIS — M76.62 ACHILLES TENDINITIS OF LEFT LOWER EXTREMITY: ICD-10-CM

## 2024-12-05 DIAGNOSIS — M92.62 HAGLUND'S DEFORMITY OF BOTH HEELS: ICD-10-CM

## 2024-12-05 DIAGNOSIS — M25.572 CHRONIC PAIN OF BOTH ANKLES: Primary | ICD-10-CM

## 2024-12-05 DIAGNOSIS — M25.571 CHRONIC PAIN OF BOTH ANKLES: Primary | ICD-10-CM

## 2024-12-05 DIAGNOSIS — E66.01 MORBID OBESITY WITH BMI OF 40.0-44.9, ADULT: ICD-10-CM

## 2024-12-05 DIAGNOSIS — M21.862 ACQUIRED POSTERIOR EQUINUS OF BOTH LOWER EXTREMITIES: ICD-10-CM

## 2024-12-05 NOTE — PROGRESS NOTES
12/05/2024  Foot and Ankle Surgery - Established Patient/Follow-up  Provider: Dr. Bipin Wells DPM  Location: [facility]    Subjective:  Sharri Bernstein is a 49 y.o. female.     Chief Complaint   Patient presents with    Right Ankle - Follow-up, Pain    Left Ankle - Follow-up, Pain    Follow-up     NADINE ayoub md  2/24/21       History of Present Illness  The patient presents for evaluation of her left foot pain.    She reports that the injections have been beneficial, providing relief for approximately 2 months. Currently, she is in a maintenance phase and has enlisted the help of a , attending gym sessions twice a week with a focus on stretching exercises. She is considering another injection due to an increase in limping over the past month. However, she expresses concern about potential side effects of the injections and has decided to forego the injection today, rating her pain level at 4.5 to 5.    She is also contemplating extending her handicap placard as winter approaches, although she does not frequently use it. She has been using heel lifts in her boots, but found the first level uncomfortable and painful for her knee, so she removed it, leaving her with a two-level lift.      Allergies   Allergen Reactions    Penicillins Unknown - High Severity    Sulfa Antibiotics Unknown - High Severity       Current Outpatient Medications on File Prior to Visit   Medication Sig Dispense Refill    Cetirizine HCl (ZyrTEC Allergy) 10 MG capsule       cholecalciferol (Vitamin D-1000 Max St) 25 MCG (1000 UT) tablet       fluticasone (FLONASE) 50 MCG/ACT nasal spray       melatonin 5 MG tablet tablet       metFORMIN (GLUCOPHAGE) 500 MG tablet metformin 500 mg tablet   TAKE 1 TABLET BY MOUTH TWICE A DAY.      Multiple Vitamins-Minerals (Multivitamin Gummies Adult) chewable tablet       Omega-3 Fatty Acids (Fish Oil Maximum Strength) 1200 MG capsule       Sodium Fluoride 5000 PPM 1.1 % paste        "vitamin D (ERGOCALCIFEROL) 1.25 MG (69217 UT) capsule capsule Take 1 capsule by mouth 2 (Two) Times a Week.      [DISCONTINUED] meloxicam (Mobic) 15 MG tablet        No current facility-administered medications on file prior to visit.       Objective   Resp 20   Ht 162.6 cm (64\")   Wt 116 kg (255 lb)   BMI 43.77 kg/m²     Foot/Ankle Exam  Physical Exam  GENERAL  Appearance:  obese  Orientation:  AAOx3  Affect:  appropriate     VASCULAR      Right Foot Vascularity   Normal vascular exam    Dorsalis pedis:  2+  Posterior tibial:  2+  Skin temperature:  warm  Edema grading:  None  CFT:  < 3 seconds  Pedal hair growth:  Present  Varicosities:  none      Left Foot Vascularity   Normal vascular exam    Dorsalis pedis:  2+  Posterior tibial:  2+  Skin temperature:  warm  Edema grading:  None  CFT:  < 3 seconds  Pedal hair growth:  Present  Varicosities:  none     NEUROLOGIC      Right Foot Neurologic   Light touch sensation: normal  Hot/Cold sensation: normal  Achilles reflex:  2+      Left Foot Neurologic   Light touch sensation: normal  Hot/Cold sensation:  normal  Achilles reflex:  2+     MUSCULOSKELETAL      Right Foot Musculoskeletal   Arch:  Normal      Left Foot Musculoskeletal   Arch:  Normal     MUSCLE STRENGTH      Right Foot Muscle Strength   Normal strength    Foot dorsiflexion:  5  Foot plantar flexion:  5  Foot inversion:  5  Foot eversion:  5      Left Foot Muscle Strength   Normal strength    Foot dorsiflexion:  5  Foot plantar flexion:  5  Foot inversion:  5  Foot eversion:  5     DERMATOLOGIC       Right Foot Dermatologic   Skin  Right foot skin is intact.   Nails comment:  Nails 1-5      Left Foot Dermatologic   Skin  Left foot skin is intact.   Nails comment:  Nails 1-5     TESTS      Right Foot Tests   Anterior drawer: negative  Varus tilt: negative      Left Foot Tests   Anterior drawer: negative  Varus tilt: negative     Right foot additional comments: Discomfort with palpation involving the " posterior aspect of the left heel greater than right. Significant hypertrophy noted to the left heel. Moderate equinus contracture with knee extended and flexed to bilateral lower extremities. Mild discomfort involving the inferior aspect of bilateral heels.     Left foot additional comments: Discomfort with palpation involving the posterior aspect of the left heel greater than right. Significant hypertrophy noted to the left heel. Moderate equinus contracture with knee extended and flexed to bilateral lower extremities. Mild discomfort involving the inferior aspect of bilateral heels.     4/15/24: Continued discomfort involving the posterior superior aspect of the left heel.  Achilles tendon remains grossly intact without any significant hypertrophy.  Swelling noted to the retrocalcaneal region.     9/5/24: No progressive deformity or instability.  Continued pain and hypertrophy involving the posterior lateral aspect of the left heel.  Discomfort with palpation.  No obvious Achilles tendon deficit.    12/5/24: Physical exam is relatively unchanged      Results      Assessment & Plan   Diagnoses and all orders for this visit:    1. Chronic pain of both ankles (Primary)    2. Achilles tendinitis of left lower extremity    3. Retrocalcaneal bursitis, left    4. Tana's deformity of both heels    5. Acquired posterior equinus of both lower extremities    6. Morbid obesity with BMI of 40.0-44.9, adult      Assessment & Plan    The primary issue appears to be at the interface between the tendon and the bone. She was advised to consider the potential risks associated with steroid injections, including skin thinning and tendon weakening. She decided to bypass the injection today to have the option available in the future if needed. The recommendation was made to wear shoes with a 1 to 1.5 inch heel and to incorporate stretching exercises into her routine. If her condition worsens, she should contact the office sooner for  an injection.    She requested an extension of her handicap placard due to the upcoming winter season. The extension will be processed.Reviewed proper basic stretching and manual therapy exercises along with appropriate shoes and activity.  Discussed proper use and/or avoidance of OTC anti-inflammatories.  Patient is to call with any additional issues or concerns.  Greater than 20 minutes was spent before, during, and after evaluation for patient care.    Follow-up  Return in 3 months for follow up.             Patient or patient representative verbalized consent for the use of Ambient Listening during the visit with  HELIO Wells DPM for chart documentation. 12/5/2024  16:56 EST    HELIO Wells DPM